# Patient Record
Sex: FEMALE | Race: OTHER | Employment: UNEMPLOYED | URBAN - METROPOLITAN AREA
[De-identification: names, ages, dates, MRNs, and addresses within clinical notes are randomized per-mention and may not be internally consistent; named-entity substitution may affect disease eponyms.]

---

## 2018-07-12 ENCOUNTER — HOSPITAL ENCOUNTER (OUTPATIENT)
Dept: MAMMOGRAPHY | Age: 53
Discharge: HOME | End: 2018-07-12
Attending: NURSE PRACTITIONER

## 2018-07-12 ENCOUNTER — OFFICE VISIT (OUTPATIENT)
Dept: FAMILY PLANNING/WOMEN'S HEALTH CLINIC | Age: 53
End: 2018-07-12

## 2018-07-12 VITALS — SYSTOLIC BLOOD PRESSURE: 133 MMHG | DIASTOLIC BLOOD PRESSURE: 93 MMHG

## 2018-07-12 DIAGNOSIS — Z12.31 SCREENING MAMMOGRAM, ENCOUNTER FOR: Primary | ICD-10-CM

## 2018-07-12 DIAGNOSIS — Z12.31 VISIT FOR SCREENING MAMMOGRAM: ICD-10-CM

## 2018-07-12 PROCEDURE — 77067 SCR MAMMO BI INCL CAD: CPT

## 2018-07-12 NOTE — PROGRESS NOTES
EVERY WOMANS LIFE HISTORY QUESTIONNAIRE       No Yes Comments   Has a doctor ever seen or felt anything wrong with your breast? [x]                                  []                                     Have you ever had a breast biopsy? [x]                                  []                                          When and where was last mammogram performed? 1/2016 in [place]    Have you ever been told that there was a problem on your mammogram?   No Yes Comments   [x]                                  []                                       Do you have breast implants? No Yes Comments   []                                  [x]                                       When was your last Pap test performed? 4/2018    Have you ever been diagnosed with any type of Cancer   No Yes Comments (type,when,where,type of treatment   [x]                                  []                                          Has a family member been diagnosed with breast or ovarian cancer? No Yes Comments (which family members, and type   [x]                                  []                                         Have you been through menopause? No Yes Date of LMP   [x]                                  []                                       Are you taking hormone replacement therapy (HRT)     No Yes Comments   [x]                                  []                                       How many times have you been pregnant? 2     Number of live births ? 2    Are you experiencing any of the following?    No Yes Comments   Nipple Discharge [x]                                  []                                     Breast Lump/Masses [x]                                  []                                     Breast Skin Changes [x]                                  []                                         Any other health problems?   no      List of Pap Providers given to pt?   [facility]

## 2018-07-12 NOTE — PROGRESS NOTES
HISTORY OF PRESENT ILLNESS  Alvin Adler is a 47 y.o. female. HPI  51yoF  here for EWL. Ms. Mario Lamas denies abnormal SBE's, performs monthly. Bilateral breast implants,  in [facility]. Denies any problems with them. LMP 2018, normal monthly cycles- no menopause s/s. Denies pregnancy. Last mammogram in , [place]. Last Pap done in 2018. Non-smoker. Review of Systems   Constitutional: Negative for diaphoresis, malaise/fatigue and weight loss. Physical Exam   Constitutional: She is oriented to person, place, and time. She appears well-developed and well-nourished. Pulmonary/Chest: Right breast exhibits no inverted nipple, no mass, no nipple discharge, no skin change and no tenderness. Left breast exhibits no inverted nipple, no mass, no nipple discharge, no skin change and no tenderness. Breasts are symmetrical.   Lymphadenopathy:     She has no cervical adenopathy. She has no axillary adenopathy. Right: No supraclavicular adenopathy present. Left: No supraclavicular adenopathy present. Neurological: She is alert and oriented to person, place, and time. Skin: Skin is warm and dry. Psychiatric: She has a normal mood and affect. Her behavior is normal. Thought content normal.   Nursing note and vitals reviewed. ASSESSMENT and PLAN  1. EWL exam  2. CBE benign  3. Screening mammogram today  4. H/O bilateral breast implants,  ([facility])  5.  SBE awareness

## 2019-12-04 ENCOUNTER — OFFICE VISIT (OUTPATIENT)
Dept: FAMILY PLANNING/WOMEN'S HEALTH CLINIC | Age: 54
End: 2019-12-04

## 2019-12-04 ENCOUNTER — HOSPITAL ENCOUNTER (OUTPATIENT)
Dept: MAMMOGRAPHY | Age: 54
Discharge: HOME | End: 2019-12-04

## 2019-12-04 VITALS — SYSTOLIC BLOOD PRESSURE: 134 MMHG | DIASTOLIC BLOOD PRESSURE: 88 MMHG

## 2019-12-04 DIAGNOSIS — Z12.31 VISIT FOR SCREENING MAMMOGRAM: ICD-10-CM

## 2019-12-04 DIAGNOSIS — Z01.419 ENCOUNTER FOR WELL WOMAN EXAM: Primary | ICD-10-CM

## 2019-12-04 PROCEDURE — 77063 BREAST TOMOSYNTHESIS BI: CPT

## 2019-12-04 NOTE — PROGRESS NOTES
Assessment/Plan:    Diagnoses and all orders for this visit:    1. Encounter for well woman exam            KILEY Villalba expressed understanding of this plan. An AVS was printed and given to the patient.      ----------------------------------------------------------------------    Chief Complaint   Patient presents with    Well Woman     EWL visit       History of Present Illness:  50 yo here for annual well woman exam  Had pap earlier this year and was told that results are normal and recommend 3 year f/up exam  Has irregular period but still having periods every other month or so- we discussed that menopause is after one year w/out period    She is in a relationship and denies risk of DV  She has breast implants since , she is not having any problems with the implants  She had her last mammo one year ago at [facility]       No past medical history on file. No Known Allergies    Social History     Tobacco Use    Smoking status: Never Smoker    Smokeless tobacco: Never Used   Substance Use Topics    Alcohol use: Not on file    Drug use: Not on file       No family history on file. Physical Exam:     Visit Vitals  /88   LMP 10/07/2019 (Exact Date)       A&Ox3  WDWN NAD  Respirations normal and non labored  Breast exam- patricia + for implants.  No masses felt

## 2019-12-04 NOTE — PROGRESS NOTES
EVERY WOMANS LIFE HISTORY QUESTIONNAIRE       No Yes Comments   Has a doctor ever seen or felt anything wrong with your breast? [x]                                  []                                     Have you ever had a breast biopsy? [x]                                  []                                          When and where was last mammogram performed? 7/2018 with [facility]    Have you ever been told that there was a problem on your mammogram?   No Yes Comments   [x]                                  []                                       Do you have breast implants? No Yes Comments   []                                  [x]                                       When was your last Pap test performed? 1/2019 at [facility]    Have you ever had an abnormal Pap test?   No Yes Comments   [x]                                  []                                       Have you had a hysterectomy? No Yes Comments (why)   [x]                                  []                                       Have you been through menopause? No Yes Date of LMP   [x]                                  []                                  10/7/19     Did your mother take IDALMIS? No Yes Unknown   []                                  []                                  X     Do you have a history of HIV exposure? No Yes    [x]                                  []                                       Have you ever been diagnosed with any type of Cancer   No Yes Comments (type,when,where,type of treatment   [x]                                  []                                          Has a family member been diagnosed with breast or ovarian cancer?    No Yes Comments (which family members, and type   [x]                                  []                                       Are you taking hormone replacement therapy (HRT)     No Yes Comments   [x]                                  []                                       How many times have you been pregnant? 2      Number of live births ? 2    Are you experiencing any of the following? No Yes Comments   Nipple Discharge [x]                                  []                                     Breast Lump/Masses [x]                                  []                                     Breast Skin Changes [x]                                  []                                          No Yes Comments   Vaginal Discharge [x]                                  []                                     Abnormal/unusual vaginal bleeding [x]                                  []                                         Are you experiencing any other health problems? Age at first period? Age at first birth?     Ht--    Wt--

## 2021-05-06 ENCOUNTER — TELEPHONE (OUTPATIENT)
Dept: FAMILY PLANNING/WOMEN'S HEALTH CLINIC | Age: 56
End: 2021-05-06

## 2021-05-06 NOTE — TELEPHONE ENCOUNTER
Calling in Re: EWL visit on 5/8/21 and Covid-19 vaccine, no answer left message to call our office.  Vikki Fischer RN

## 2021-05-08 ENCOUNTER — OFFICE VISIT (OUTPATIENT)
Dept: FAMILY PLANNING/WOMEN'S HEALTH CLINIC | Age: 56
End: 2021-05-08

## 2021-05-08 DIAGNOSIS — Z12.31 SCREENING MAMMOGRAM, ENCOUNTER FOR: Primary | ICD-10-CM

## 2021-05-08 NOTE — PROGRESS NOTES
HISTORY OF PRESENT ILLNESS  Danielle Isaacs is a 48 y.o. female here for EWL. HPI Ms. Virk, , denies abnormal SBE's, performs monthly. H/O dense breasts. Her last mammogram in 2019 was a 3D that was normal. H/O breast implants placed in  in [facility]. She is UTD with her Pap/pelvic- completed at [facility] in 2019. [facility] also follows her for primary care. She declines a pelvic exam today. LMP 2019. She tried HRT for one month this year and started to bleed. She was taken off it and denies issues since. Review of Systems   Constitutional: Negative. Respiratory: Negative. Cardiovascular: Negative. Gastrointestinal: Negative. Genitourinary: Negative. Physical Exam  Nursing note reviewed. Constitutional:       Appearance: Normal appearance. Chest:      Breasts:         Right: No swelling, bleeding, inverted nipple, mass, nipple discharge, skin change or tenderness. Left: No bleeding, inverted nipple, mass, nipple discharge, skin change or tenderness. Lymphadenopathy:      Upper Body:      Right upper body: No supraclavicular, axillary or pectoral adenopathy. Left upper body: No supraclavicular, axillary or pectoral adenopathy. Skin:     General: Skin is warm and dry. Neurological:      Mental Status: She is alert and oriented to person, place, and time. Psychiatric:         Mood and Affect: Mood normal.         Behavior: Behavior normal.         Thought Content: Thought content normal.         Judgment: Judgment normal.         ASSESSMENT and PLAN  1. EWL  2. CBE benign  3. 3D/Tomosynthesis screening mammogram- to be scheduled at a different site      -H/O heterogeneously dense breasts; bilateral breast implants,  ([facility])  4. Followed at [facility] for primary care and Pap/pelvic- reports UTD  5.  Menopause, LMP 2019

## 2021-05-08 NOTE — PROGRESS NOTES
EVERY WOMANS LIFE HISTORY QUESTIONNAIRE       No Yes Comments   Has a doctor ever seen or felt anything wrong with your breast? [x]                                  []                                     Have you ever had a breast biopsy? [x]                                  []                                          When and where was last mammogram performed? [facility] 12/2019    Have you ever been told that there was a problem on your mammogram?   No Yes Comments   [x]                                  []                                       Do you have breast implants? No Yes Comments   []                                  [x]                                       When was your last Pap test performed? 7/2019  [facility]    Have you ever had an abnormal Pap test?   No Yes Comments   [x]                                  []                                       Have you had a hysterectomy? No Yes Comments (why)   [x]                                  []                                       Have you been through menopause? No Yes Date of LMP   []                                  [x]                                  11/2019     Did your mother take IDALMIS? No Yes Unknown   [x]                                  []                                       Do you have a history of HIV exposure? No Yes    [x]                                  []                                       Have you ever been diagnosed with any type of Cancer   No Yes Comments (type,when,where,type of treatment   [x]                                  []                                          Has a family member been diagnosed with breast or ovarian cancer?    No Yes Comments (which family members, and type   [x]                                  []                                       Are you taking hormone replacement therapy (HRT)     No Yes Comments   [x]                                  []                                  Did wear a patch in 1/2021 for one month, started bleeding. Did not use again and no more bleeding. How many times have you been pregnant? 2       Number of live births ? 2    Are you experiencing any of the following? No Yes Comments   Nipple Discharge [x]                                  []                                     Breast Lump/Masses [x]                                  []                                     Breast Skin Changes [x]                                  []                                          No Yes Comments   Vaginal Discharge [x]                                  []                                     Abnormal/unusual vaginal bleeding [x]                                  []                                         Are you experiencing any other health problems? Pt will continue to go to [facility] for PAPs        Age at first period?  13    Age at first birth? 25    Ht-- 5'2\"    Wt--143lbs

## 2021-05-21 ENCOUNTER — TRANSCRIBE ORDER (OUTPATIENT)
Dept: SCHEDULING | Age: 56
End: 2021-05-21

## 2021-05-21 DIAGNOSIS — Z12.31 VISIT FOR SCREENING MAMMOGRAM: Primary | ICD-10-CM

## 2021-07-12 ENCOUNTER — TELEPHONE (OUTPATIENT)
Dept: FAMILY PLANNING/WOMEN'S HEALTH CLINIC | Age: 56
End: 2021-07-12

## 2021-07-12 NOTE — TELEPHONE ENCOUNTER
Patient calling to get 3D mammogram appt. Rescheduled, patient was not able to attend her appt. On 5/24/21 (per patient she called to cancel appt. and was told that it could not be cancel that she just not needed to show up). Message has been sent to [facility]  to reschedule appt. instructed patient that someone will be calling to schedule apt.  Patient prefers WC any day after Ania Stevenson RN

## 2021-07-20 ENCOUNTER — HOSPITAL ENCOUNTER (OUTPATIENT)
Dept: MAMMOGRAPHY | Age: 56
Discharge: HOME | End: 2021-07-20
Attending: NURSE PRACTITIONER

## 2021-07-20 DIAGNOSIS — Z12.31 SCREENING MAMMOGRAM, ENCOUNTER FOR: ICD-10-CM

## 2021-07-20 PROCEDURE — 77063 BREAST TOMOSYNTHESIS BI: CPT

## 2022-09-28 ENCOUNTER — OFFICE VISIT (OUTPATIENT)
Dept: CARDIOLOGY CLINIC | Age: 57
End: 2022-09-28

## 2022-09-28 VITALS
BODY MASS INDEX: 23.92 KG/M2 | WEIGHT: 130 LBS | DIASTOLIC BLOOD PRESSURE: 80 MMHG | SYSTOLIC BLOOD PRESSURE: 110 MMHG | HEART RATE: 77 BPM | HEIGHT: 62 IN | OXYGEN SATURATION: 100 %

## 2022-09-28 DIAGNOSIS — R07.9 CHEST PAIN, UNSPECIFIED TYPE: Primary | ICD-10-CM

## 2022-09-28 DIAGNOSIS — I49.3 PVC (PREMATURE VENTRICULAR CONTRACTION): ICD-10-CM

## 2022-09-28 DIAGNOSIS — Z76.89 ENCOUNTER TO ESTABLISH CARE: ICD-10-CM

## 2022-09-28 PROCEDURE — 99204 OFFICE O/P NEW MOD 45 MIN: CPT | Performed by: SPECIALIST

## 2022-09-28 PROCEDURE — 93000 ELECTROCARDIOGRAM COMPLETE: CPT | Performed by: SPECIALIST

## 2022-09-28 RX ORDER — ATORVASTATIN CALCIUM 20 MG/1
20 TABLET, FILM COATED ORAL DAILY
COMMUNITY

## 2022-09-28 NOTE — PROGRESS NOTES
Julian Bucio MD. [facility] [place]          Patient: Cole Jewell  : 1965      Today's Date: 2022        HISTORY OF PRESENT ILLNESS:     History of Present Illness:  Referred for a cardiac evaluation. She had some chest tightness a couple of weeks back - came on randomly and resolved on its own. No exertional CP. No SOB. No other complaints. Denies palpitations. PAST MEDICAL HISTORY:     Past Medical History:   Diagnosis Date    Dyslipidemia        Past Surgical History:   Procedure Laterality Date    IMPLANT BREAST SILICONE/EQ  9641             CURRENT MEDICATIONS:    .  Current Outpatient Medications   Medication Sig Dispense Refill    atorvastatin (LIPITOR) 20 mg tablet Take 20 mg by mouth daily. No Known Allergies      SOCIAL HISTORY:     Social History     Tobacco Use    Smoking status: Former     Types: Cigarettes    Smokeless tobacco: Never   Substance Use Topics    Alcohol use: Yes         FAMILY HISTORY:     Family History   Problem Relation Age of Onset    Cancer Maternal Grandmother          REVIEW OF SYMPTOMS:     Review of Symptoms:  Constitutional: Negative for fever, chills  HEENT: Negative for nosebleeds, tinnitus, and vision changes. Respiratory: Negative for cough, wheezing  Cardiovascular: Negative for orthopnea, claudication, syncope, and PND. Gastrointestinal: Negative for abdominal pain, diarrhea, melena. Genitourinary: Negative for dysuria  Musculoskeletal: Negative for myalgias. Skin: Negative for rash  Heme: No problems bleeding. Neurological: Negative for speech change and focal weakness. PHYSICAL EXAM:     Physical Exam:  Visit Vitals  /80 (BP 1 Location: Left upper arm, BP Patient Position: Sitting)   Pulse 77   Ht 5' 2\" (1.575 m)   Wt 130 lb (59 kg)   LMP 10/07/2019 (Exact Date)   SpO2 100%   BMI 23.78 kg/m²       Patient appears generally well, mood and affect are appropriate and pleasant.   HEENT:  Hearing intact, non-icteric, normocephalic, atraumatic. Neck Exam: Supple, No JVD    Lung Exam: Clear to auscultation, even breath sounds. Cardiac Exam: Regular rate and rhythm with no murmur or rub  Abdomen: Soft, non-tender  Extremities: Moves all ext well. No lower extremity edema. MSKTL: Overall good ROM ext  Skin: No significant rashes  Psych: Appropriate affect  Neuro - Grossly intact        LABS / OTHER STUDIES:     Followed by PCP       CARDIAC DIAGNOSTICS:     Cardiac Evaluation Includes:  I reviewed the test results below. EKG 9/29/22 - NSR, non-specific T wave abn, 2 PVC's      ASSESSMENT AND PLAN:     Assessment and Plan:    1) Atypical CP  - had CP 2 weeks prior - was atypical   - EKG with non-specific T wave abn   - Check an echo and treadmill stress test   - Info given on a CT heart scan to further risk stratify     2) Two PVC's on EKG   - denies palpitations   - cardiac testing as above   - Also check a 48 hour holter to quantify      3) Dyslipidemia   - cont statin   - lipids followed by PCP     4) Phone FU. See me PRN. Drew Dennison MD, [address], [address]  [place] [address] Suite [facility], [address]  [address]  Ph: [phone]   Ph [phone]        ADDENDUM   11/8/2022    Echo 11/8/22 - LVEF 55-60%,     Treadmill Stress test 11/8/22 - walked 7:13 (9.9 METS), no CP or ischemic EKG changes       Will send a message       ADDENDUM   11/20/2022  2 Day Holter 11/7/22 - NSR, Avg HR 83 bpm, PVC [place] 6.4%,     Will call       ADDENDUM   11/22/2022    Has asymptomatic PVC's.  6% PVC burden. Normal echo and stress test.   Will follow clinically --- see me back in one year. I called and spoke to her daughter.

## 2022-09-28 NOTE — PROGRESS NOTES
Orders for Check an echo, treadmill stress test, 48 hour Holter (for CP, PVC's)  per Dr. Ethyl Sever VO.

## 2022-09-28 NOTE — PROGRESS NOTES
Chief Complaint   Patient presents with    New Patient    Cholesterol Problem     Visit Vitals  /80 (BP 1 Location: Left upper arm, BP Patient Position: Sitting)   Pulse 77   Ht 5' 2\" (1.575 m)   Wt 130 lb (59 kg)   SpO2 100%   BMI 23.78 kg/m²     Chest pain - 2 weeks ago felt pressure on her chest.   SOB denied   Palpitations denied   Swelling in hands/feet denied   Dizziness denied   Recent hospital stays denied   Refills denied

## 2022-11-07 ENCOUNTER — CLINICAL SUPPORT (OUTPATIENT)
Dept: CARDIOLOGY CLINIC | Age: 57
End: 2022-11-07

## 2022-11-07 ENCOUNTER — ANCILLARY PROCEDURE (OUTPATIENT)
Dept: CARDIOLOGY CLINIC | Age: 57
End: 2022-11-07

## 2022-11-07 VITALS
HEIGHT: 62 IN | BODY MASS INDEX: 23.92 KG/M2 | WEIGHT: 130 LBS | SYSTOLIC BLOOD PRESSURE: 100 MMHG | DIASTOLIC BLOOD PRESSURE: 72 MMHG

## 2022-11-07 VITALS — BODY MASS INDEX: 23.92 KG/M2 | HEIGHT: 62 IN | WEIGHT: 130 LBS

## 2022-11-07 DIAGNOSIS — I49.3 PVC (PREMATURE VENTRICULAR CONTRACTION): ICD-10-CM

## 2022-11-07 DIAGNOSIS — I49.3 PVC (PREMATURE VENTRICULAR CONTRACTION): Primary | ICD-10-CM

## 2022-11-07 DIAGNOSIS — R07.9 CHEST PAIN, UNSPECIFIED TYPE: ICD-10-CM

## 2022-11-07 LAB
ECHO AO ROOT DIAM: 2.9 CM
ECHO AO ROOT INDEX: 1.82 CM/M2
ECHO AV AREA PEAK VELOCITY: 2.2 CM2
ECHO AV AREA VTI: 2.2 CM2
ECHO AV AREA/BSA PEAK VELOCITY: 1.4 CM2/M2
ECHO AV AREA/BSA VTI: 1.4 CM2/M2
ECHO AV MEAN GRADIENT: 3 MMHG
ECHO AV MEAN VELOCITY: 0.8 M/S
ECHO AV PEAK GRADIENT: 6 MMHG
ECHO AV PEAK VELOCITY: 1.2 M/S
ECHO AV VELOCITY RATIO: 0.83
ECHO AV VTI: 23.8 CM
ECHO EST RA PRESSURE: 3 MMHG
ECHO LA DIAMETER INDEX: 1.89 CM/M2
ECHO LA DIAMETER: 3 CM
ECHO LA TO AORTIC ROOT RATIO: 1.03
ECHO LA VOL 2C: 33 ML (ref 22–52)
ECHO LA VOL 4C: 21 ML (ref 22–52)
ECHO LA VOL BP: 28 ML (ref 22–52)
ECHO LA VOL/BSA BIPLANE: 18 ML/M2 (ref 16–34)
ECHO LA VOLUME AREA LENGTH: 30 ML
ECHO LA VOLUME INDEX A2C: 21 ML/M2 (ref 16–34)
ECHO LA VOLUME INDEX A4C: 13 ML/M2 (ref 16–34)
ECHO LA VOLUME INDEX AREA LENGTH: 19 ML/M2 (ref 16–34)
ECHO LV E' LATERAL VELOCITY: 10 CM/S
ECHO LV E' SEPTAL VELOCITY: 8 CM/S
ECHO LV EDV A2C: 59 ML
ECHO LV EDV A4C: 57 ML
ECHO LV EDV BP: 58 ML (ref 56–104)
ECHO LV EDV INDEX A4C: 36 ML/M2
ECHO LV EDV INDEX BP: 36 ML/M2
ECHO LV EDV NDEX A2C: 37 ML/M2
ECHO LV EJECTION FRACTION A2C: 58 %
ECHO LV EJECTION FRACTION A4C: 57 %
ECHO LV EJECTION FRACTION BIPLANE: 57 % (ref 55–100)
ECHO LV ESV A2C: 25 ML
ECHO LV ESV A4C: 24 ML
ECHO LV ESV BP: 25 ML (ref 19–49)
ECHO LV ESV INDEX A2C: 16 ML/M2
ECHO LV ESV INDEX A4C: 15 ML/M2
ECHO LV ESV INDEX BP: 16 ML/M2
ECHO LV FRACTIONAL SHORTENING: 33 % (ref 28–44)
ECHO LV INTERNAL DIMENSION DIASTOLE INDEX: 2.45 CM/M2
ECHO LV INTERNAL DIMENSION DIASTOLIC: 3.9 CM (ref 3.9–5.3)
ECHO LV INTERNAL DIMENSION SYSTOLIC INDEX: 1.64 CM/M2
ECHO LV INTERNAL DIMENSION SYSTOLIC: 2.6 CM
ECHO LV IVSD: 0.9 CM (ref 0.6–0.9)
ECHO LV MASS 2D: 105.3 G (ref 67–162)
ECHO LV MASS INDEX 2D: 66.2 G/M2 (ref 43–95)
ECHO LV POSTERIOR WALL DIASTOLIC: 0.9 CM (ref 0.6–0.9)
ECHO LV RELATIVE WALL THICKNESS RATIO: 0.46
ECHO LVOT AREA: 2.5 CM2
ECHO LVOT AV VTI INDEX: 0.82
ECHO LVOT DIAM: 1.8 CM
ECHO LVOT MEAN GRADIENT: 2 MMHG
ECHO LVOT PEAK GRADIENT: 4 MMHG
ECHO LVOT PEAK VELOCITY: 1 M/S
ECHO LVOT STROKE VOLUME INDEX: 31.4 ML/M2
ECHO LVOT SV: 49.9 ML
ECHO LVOT VTI: 19.6 CM
ECHO MV A VELOCITY: 0.83 M/S
ECHO MV AREA PHT: 4 CM2
ECHO MV E DECELERATION TIME (DT): 188.8 MS
ECHO MV E VELOCITY: 0.64 M/S
ECHO MV E/A RATIO: 0.77
ECHO MV E/E' LATERAL: 6.4
ECHO MV E/E' RATIO (AVERAGED): 7.2
ECHO MV E/E' SEPTAL: 8
ECHO MV PRESSURE HALF TIME (PHT): 54.8 MS
ECHO RIGHT VENTRICULAR SYSTOLIC PRESSURE (RVSP): 28 MMHG
ECHO RV FREE WALL PEAK S': 11 CM/S
ECHO RV INTERNAL DIMENSION: 3.3 CM
ECHO RV TAPSE: 2 CM (ref 1.7–?)
ECHO TV REGURGITANT MAX VELOCITY: 2.48 M/S
ECHO TV REGURGITANT PEAK GRADIENT: 25 MMHG
STRESS ANGINA INDEX: 0
STRESS BASELINE DIAS BP: 78 MMHG
STRESS BASELINE HR: 81 BPM
STRESS BASELINE SYS BP: 116 MMHG
STRESS ESTIMATED WORKLOAD: 9.9 METS
STRESS EXERCISE DUR MIN: 7 MIN
STRESS EXERCISE DUR SEC: 13 SEC
STRESS O2 SAT PEAK: 100 %
STRESS O2 SAT REST: 100 %
STRESS PEAK DIAS BP: 86 MMHG
STRESS PEAK SYS BP: 140 MMHG
STRESS PERCENT HR ACHIEVED: 101 %
STRESS POST PEAK HR: 166 BPM
STRESS RATE PRESSURE PRODUCT: NORMAL BPM*MMHG
STRESS SR DUKE TREADMILL SCORE: 7
STRESS ST DEPRESSION: 0 MM
STRESS TARGET HR: 164 BPM

## 2022-11-07 PROCEDURE — 93306 TTE W/DOPPLER COMPLETE: CPT | Performed by: SPECIALIST

## 2022-11-07 PROCEDURE — 93225 XTRNL ECG REC<48 HRS REC: CPT | Performed by: SPECIALIST

## 2022-11-07 PROCEDURE — 93227 XTRNL ECG REC<48 HR R&I: CPT | Performed by: SPECIALIST

## 2022-11-07 PROCEDURE — 93015 CV STRESS TEST SUPVJ I&R: CPT | Performed by: SPECIALIST

## 2022-11-09 ENCOUNTER — TELEPHONE (OUTPATIENT)
Dept: CARDIOLOGY CLINIC | Age: 57
End: 2022-11-09

## 2022-11-09 NOTE — TELEPHONE ENCOUNTER
Can you please let her know that her echo and stress test are normal.     Thanks,   SK     No ans LVM to call back.

## 2023-12-26 ENCOUNTER — TELEPHONE (OUTPATIENT)
Age: 58
End: 2023-12-26

## 2023-12-26 NOTE — TELEPHONE ENCOUNTER
Called patient to remind of upcoming appointment with [facility] Every Woman's Life program on 1/3/2024- no answer. Left message with appt. Details and EWL hotline in case that she wants to cancel/reschedule.   Daiana Enciso